# Patient Record
(demographics unavailable — no encounter records)

---

## 2025-04-02 NOTE — ASSESSMENT
[FreeTextEntry1] : Norma Anderson is a former 34+1 gestation infant, now chronologic age of 12 months   31 days corrected age of 11 months   21 days seen in  follow-up. Baby was discharged from [facility] on 3/12/24. Pertinent NICU history includes resolved RDS, feeding difficulties and breech presentation.      The following issues were addressed at this visit.      Growth and nutrition: Birth metrics were 2250g (46%) for weight, 31.5cm (57%) for HC and 44cm (36%) for height.  Weight at last visit (8570 g) plotted at the 93rd  percentile for corrected age with Head growth (45 cm) and length (71.5 cm) at the 99 th and 100 th percentiles respectively. Weight today (10.92 g) plots at the 89th percentile for corrected age. Head growth (48 cm) and length (81 cm) are at the 95th and 62nd percentiles respectively.  Baby is currently feeding Sim 360 ad gavino, tolerating mostly pureed and soft foods however intermittently takes some textured foods.  Does not have full meals yet but showing more interest.  Evaluated by speech for feeding difficulties and will continue at this time. Advised to transition off formulat to whole milk.  Continue vitamin supplements.      Development/neuro: Developmental assessment completed with Ages and Stages questionairre and BINS assessment.  Baby at mild risk developmental delay for chronologic age specifically expressive, given home exercises to do.  Recommend continued work with private speech/feeding therapist.  Early Intervention is not needed at this time. Hearing screen passed      Breech presentation at birth: Infant was at risk for developmental dysplasia of the the hips. Hip US completed between 44-46 weeks corrected age. Hip US WNL.      Other:   Health maintenance: Reviewed routine vaccination schedule with parent as well as guidance for flu vaccine for family, RSV vaccination and need for PMD f/u. Also discussed bathing and skin care recommendations.       Reviewed notes by (other services)      Next neonatology f/u: 6 months (2025)

## 2025-04-02 NOTE — PHYSICAL EXAM
[Pink] : pink [Well Perfused] : well perfused [No Rashes] : no rashes [Moist and Pink Mucous Membranes] : moist and pink mucous membranes [No Torticollis] : no torticollis [No Neck Masses] : no neck masses [Symmetric Expansion] : symmetric chest expansion [Non Distended] : non distended [No Masses] : no masses were palpated [No Umbilical Hernia] : no umbilical hernia [Normal muscle tone] : normal muscle tone of all extremites [Normal truncal tone] : normal truncal tone [Normal deep tendon reflexes] : normal deep tendon reflexes [No head lag] : no head lag [Fixes On Faces] : fixes on faces [Follows to Midline] : the gaze follows to the midline [Follows Past Midline] : the gaze follows past the midline [Follows 180 Degrees] : visual track 180 degrees [Smiles Sociallly] : has a social smile [Laughs] : laughs [[place]] : coos [Babbles] : babbles [Lifts Head And Chest 45 degress in Prone] : lifts the head and chest 45 degress in prone [Weight Shifts in Prone] : weight shifts in prone [Reaches For Objects in Prone] : reaches for objects in prone [Rolls Front to Back] : rolls front to back [Separates Hip Girdle From Trunk in Rolling] : separates hip girdle from trunk in rolling  [Rolls Back to Front] : rolls over from back to front [Brings Feet to Mouth] : brings feet to mouth [Gets to Quadruped] : gets to quadruped [Maintains Quadruped] : maintains quadruped [Crawls] : crawls  [Creeps] : creeps [Sits With Support] : sits with support [Tripod Sits with Support] : tripod sits with support [Sits With Support with Back Straight] : sits with support with back straight [Pulls Stand] : pulls self to a standing position [Hands Open] : the hands open [Reaches for Objects] : reaches for objects [Transfers Objects] : transfers objects from hand to hand [Rakes Small Objects] : rakes small objects [Mature Pincer Grasp] : has a mature pincer grasp [Swats at Objects] : swats at objects [Brings Hands to Mouth] : brings hands to mouth [Brings Hands to Midline] : brings hands to midline [Brings Objects to Mouth] : brings objects to mouth

## 2025-04-02 NOTE — HISTORY OF PRESENT ILLNESS
[Car seat use according to directions] : car seat used according to directions [No Feeding Issues] : no feeding issues at this time [Solid Foods] : eating solid foods [___Formula] : [unfilled] [de-identified] : Infant born at 34.1 to 44 yo  mom, prenatal labs: HIV NR, Hep B neg, RPR NR, Rub I, GBS neg 3/1, Blood type O+. Maternal medical hx of hypothyroidism on synthroid. IVF pregnancy with donor egg. Mom presented with PPROM on 3/1 at 11am, ruptured for 41 hours. Received BMZ x2. Primary c/s for PTL and cat II FHT. APGAR 9/9. Received CPAP+5 in DR. Admitted to NICU for prematurity and respiratory distress.   Hospital Course	   Respiratory: Admitted on BCPAP+5 for respiratory distress. Chest x-ray consistent with transient tachypnea of the . Weaned to RA on DOL 0.   Infectious: Blood culture NGTD. S/p amp/gent 36 hour sepsis rule out. Hepatitis B vaccine  and Beyfortus given .   Cardiac: Hemodynamically stable.  No murmur appreciated   Heme: A+ david negative. Bili remained below phototherapy level.   Metabolic: Infant was hypoglycemic on admission received IV fluids via PIV from 3/3-3/5, since then euglycemic. Initiated feeds on DOL 0. Tolerating  adlib feeds, gaining weight  Voiding and stooling appropriately.    Neuro: Appropriate for gestational age. Weaned to crib on 3/7. Euthermic in open crib.      Other:    Circumcision done on 3/12   Breech presentation: Hip Us at 44-46 weeks CGA  [de-identified] : 27-30 oz/day

## 2025-04-02 NOTE — DISCUSSION/SUMMARY
[GA at Birth: ___] : GA at Birth: [unfilled] [Chronological Age: ___] : Chronological Age: [unfilled] [Corrected Age: ___] : Corrected Age: [unfilled] [RA] : room air [Spoon] : spoon [Finger feeds] : finger feeds [High chair] : high chair [90/90/90] : 90/90/90 [Graded control] : graded control [Crawling] : crawling [Enjoys messy] : enjoys messy [Frequent consonant babbling] : frequent consonant babbling [Explores environment] : explores environment [Visual attention] : visual attention [Eye contact] : eye contact [WNL] : with in normal limits [] : Yes [ST] : ST [FreeTextEntry1] : ex34-1 week late preemie with h/o RD, transient tachypnea, and breech positioning in utero (but US at 44-46 weeks ruled out hip dysplasia),  [FreeTextEntry2] : Private SLP [FreeTextEntry8] : concerns with eating and speech [FreeTextEntry9] : Received feeding therapy x1 [de-identified] : Norma was received today with his mom and grandma. He presented awake, alert, smiling and babbling. Norma demonstrated a 3 jaw linn, as well as transitions in and out of sitting and quadruped. He is pulling to stand and cruising. He is also clapping. Receptive speech is as expected, with expressive slightly behind and feeding being slightly challenging. 2 languages are spoken in the household, and this is known in the long term to be excellent for brain development; but often delays in initial acquisition of expressive language. Use of signs in conjunction with words was suggested to Norma's mom; and she stated that she has already gotten a book of signs but not yet started using them. Due to feeding and speech being the areas of concern. Continued work with a SLP for both is recommended at this time.

## 2025-04-02 NOTE — PHYSICAL EXAM
[Pink] : pink [Well Perfused] : well perfused [No Rashes] : no rashes [Moist and Pink Mucous Membranes] : moist and pink mucous membranes [No Torticollis] : no torticollis [No Neck Masses] : no neck masses [Symmetric Expansion] : symmetric chest expansion [Non Distended] : non distended [No Masses] : no masses were palpated [No Umbilical Hernia] : no umbilical hernia [Normal muscle tone] : normal muscle tone of all extremites [Normal truncal tone] : normal truncal tone [Normal deep tendon reflexes] : normal deep tendon reflexes [No head lag] : no head lag [Fixes On Faces] : fixes on faces [Follows to Midline] : the gaze follows to the midline [Follows Past Midline] : the gaze follows past the midline [Follows 180 Degrees] : visual track 180 degrees [Smiles Sociallly] : has a social smile [Laughs] : laughs [[place]] : coos [Babbles] : babbles [Lifts Head And Chest 45 degress in Prone] : lifts the head and chest 45 degress in prone [Weight Shifts in Prone] : weight shifts in prone [Reaches For Objects in Prone] : reaches for objects in prone [Rolls Front to Back] : rolls front to back [Separates Hip Girdle From Trunk in Rolling] : separates hip girdle from trunk in rolling  [Rolls Back to Front] : rolls over from back to front [Brings Feet to Mouth] : brings feet to mouth [Gets to Quadruped] : gets to quadruped [Maintains Quadruped] : maintains quadruped [Crawls] : crawls  [Creeps] : creeps [Sits With Support] : sits with support [Tripod Sits with Support] : tripod sits with support [Sits With Support with Back Straight] : sits with support with back straight [Pulls Stand] : pulls self to a standing position [Hands Open] : the hands open [Reaches for Objects] : reaches for objects [Transfers Objects] : transfers objects from hand to hand [Rakes Small Objects] : rakes small objects [Mature Pincer Grasp] : has a mature pincer grasp [Swats at Objects] : swats at objects [Brings Hands to Mouth] : brings hands to mouth [Brings Hands to Midline] : brings hands to midline [Brings Objects to Mouth] : brings objects to mouth

## 2025-04-02 NOTE — DISCUSSION/SUMMARY
[GA at Birth: ___] : GA at Birth: [unfilled] [Chronological Age: ___] : Chronological Age: [unfilled] [Corrected Age: ___] : Corrected Age: [unfilled] [RA] : room air [Spoon] : spoon [Finger feeds] : finger feeds [High chair] : high chair [90/90/90] : 90/90/90 [Graded control] : graded control [Crawling] : crawling [Enjoys messy] : enjoys messy [Frequent consonant babbling] : frequent consonant babbling [Explores environment] : explores environment [Visual attention] : visual attention [Eye contact] : eye contact [WNL] : with in normal limits [] : Yes [ST] : ST [FreeTextEntry1] : ex34-1 week late preemie with h/o RD, transient tachypnea, and breech positioning in utero (but US at 44-46 weeks ruled out hip dysplasia),  [FreeTextEntry2] : Private SLP [FreeTextEntry8] : concerns with eating and speech [FreeTextEntry9] : Received feeding therapy x1 [de-identified] : Norma was received today with his mom and grandma. He presented awake, alert, smiling and babbling. Norma demonstrated a 3 jaw linn, as well as transitions in and out of sitting and quadruped. He is pulling to stand and cruising. He is also clapping. Receptive speech is as expected, with expressive slightly behind and feeding being slightly challenging. 2 languages are spoken in the household, and this is known in the long term to be excellent for brain development; but often delays in initial acquisition of expressive language. Use of signs in conjunction with words was suggested to Norma's mom; and she stated that she has already gotten a book of signs but not yet started using them. Due to feeding and speech being the areas of concern. Continued work with a SLP for both is recommended at this time.

## 2025-04-02 NOTE — HISTORY OF PRESENT ILLNESS
[Car seat use according to directions] : car seat used according to directions [No Feeding Issues] : no feeding issues at this time [Solid Foods] : eating solid foods [___Formula] : [unfilled] [de-identified] : Infant born at 34.1 to 46 yo  mom, prenatal labs: HIV NR, Hep B neg, RPR NR, Rub I, GBS neg 3/1, Blood type O+. Maternal medical hx of hypothyroidism on synthroid. IVF pregnancy with donor egg. Mom presented with PPROM on 3/1 at 11am, ruptured for 41 hours. Received BMZ x2. Primary c/s for PTL and cat II FHT. APGAR 9/9. Received CPAP+5 in DR. Admitted to NICU for prematurity and respiratory distress.   Hospital Course	   Respiratory: Admitted on BCPAP+5 for respiratory distress. Chest x-ray consistent with transient tachypnea of the . Weaned to RA on DOL 0.   Infectious: Blood culture NGTD. S/p amp/gent 36 hour sepsis rule out. Hepatitis B vaccine  and Beyfortus given .   Cardiac: Hemodynamically stable.  No murmur appreciated   Heme: A+ david negative. Bili remained below phototherapy level.   Metabolic: Infant was hypoglycemic on admission received IV fluids via PIV from 3/3-3/5, since then euglycemic. Initiated feeds on DOL 0. Tolerating  adlib feeds, gaining weight  Voiding and stooling appropriately.    Neuro: Appropriate for gestational age. Weaned to crib on 3/7. Euthermic in open crib.      Other:    Circumcision done on 3/12   Breech presentation: Hip Us at 44-46 weeks CGA  [de-identified] : 27-30 oz/day

## 2025-04-02 NOTE — ASSESSMENT
[FreeTextEntry1] : Norma Anderson is a former 34+1 gestation infant, now chronologic age of 12 months   31 days corrected age of 11 months   21 days seen in  follow-up. Baby was discharged from [facility] on 3/12/24. Pertinent NICU history includes resolved RDS, feeding difficulties and breech presentation.      The following issues were addressed at this visit.      Growth and nutrition: Birth metrics were 2250g (46%) for weight, 31.5cm (57%) for HC and 44cm (36%) for height.  Weight at last visit (8570 g) plotted at the 93rd  percentile for corrected age with Head growth (45 cm) and length (71.5 cm) at the 99 th and 100 th percentiles respectively. Weight today (10.92 g) plots at the 89th percentile for corrected age. Head growth (48 cm) and length (81 cm) are at the 95th and 62nd percentiles respectively.  Baby is currently feeding Sim 360 ad gavino, tolerating mostly pureed and soft foods however intermittently takes some textured foods.  Does not have full meals yet but showing more interest.  Evaluated by speech for feeding difficulties and will continue at this time. Advised to transition off formulat to whole milk.  Continue vitamin supplements.      Development/neuro: Developmental assessment completed with Ages and Stages questionairre and BINS assessment.  Baby at mild risk developmental delay for chronologic age specifically expressive, given home exercises to do.  Recommend continued work with private speech/feeding therapist.  Early Intervention is not needed at this time. Hearing screen passed      Breech presentation at birth: Infant was at risk for developmental dysplasia of the the hips. Hip US completed between 44-46 weeks corrected age. Hip US WNL.      Other:   Health maintenance: Reviewed routine vaccination schedule with parent as well as guidance for flu vaccine for family, RSV vaccination and need for PMD f/u. Also discussed bathing and skin care recommendations.       Reviewed notes by (other services)      Next neonatology f/u: 6 months (2025)